# Patient Record
Sex: FEMALE | Race: WHITE | Employment: PART TIME | URBAN - METROPOLITAN AREA
[De-identification: names, ages, dates, MRNs, and addresses within clinical notes are randomized per-mention and may not be internally consistent; named-entity substitution may affect disease eponyms.]

---

## 2019-10-09 ENCOUNTER — THERAPY VISIT (OUTPATIENT)
Dept: PHYSICAL THERAPY | Facility: CLINIC | Age: 59
End: 2019-10-09
Payer: COMMERCIAL

## 2019-10-09 DIAGNOSIS — Z47.89 AFTERCARE FOLLOWING SURGERY OF THE MUSCULOSKELETAL SYSTEM: ICD-10-CM

## 2019-10-09 DIAGNOSIS — M76.821 TIBIALIS POSTERIOR TENDONITIS, RIGHT: ICD-10-CM

## 2019-10-09 DIAGNOSIS — M79.671 RIGHT FOOT PAIN: ICD-10-CM

## 2019-10-09 PROCEDURE — 97110 THERAPEUTIC EXERCISES: CPT | Mod: GP | Performed by: PHYSICAL THERAPIST

## 2019-10-09 PROCEDURE — 97140 MANUAL THERAPY 1/> REGIONS: CPT | Mod: GP | Performed by: PHYSICAL THERAPIST

## 2019-10-09 PROCEDURE — 97161 PT EVAL LOW COMPLEX 20 MIN: CPT | Mod: GP | Performed by: PHYSICAL THERAPIST

## 2019-10-09 NOTE — LETTER
[facility]  [address]  [facility] [address]  [phone]    2019    Re: Rhoda TAMMY Goel   :   1960  MRN:  [id number]   REFERRING PHYSICIAN:   Radu Martinez    [facility]    Date of Initial Evaluation:  10/19/19  Visits:  Rxs Used: 1  Reason for Referral:     Tibialis posterior tendonitis, right  Right foot pain  Aftercare following surgery of the musculoskeletal system    EVALUATION SUMMARY    [facility] Initial Evaluation  Subjective:  Pt presents to PT s/p right foot Puma procedure, bunion correction, posterior tibialis tenolysis, and bone spur removal of the tarso-metatarsal joints.  DOS was 19.  She is currently NWB and wearing a cam boot.    The history is provided by the patient.   Type of problem:  Right ankle and right foot   Condition occurred with:  Insidious onset.     Patient reports pain:  Medial.  Associated symptoms:  Loss of strength, edema and loss of motion/stiffness. Symptoms are exacerbated by weight bearing and walking and relieved by rest and ice.  Pertinent medical history includes:  Cancer, numbness/tingling, osteoarthritis, overweight and thyroid problems.    Surgeries include:  Cancer surgery and orthopedic surgery.  Current medications:  Anti-inflammatory and pain medication.   Primary job tasks include:  Computer work, prolonged standing and lifting/carrying.           Patient is Medical .   Objective:  Gait:    Weight Bearing Status:  NWB   Assistive Devices:  CAM  Ankle/Foot Evaluation  ROM:    AROM:    Dorsiflexion:  Left:   10  Right:   2  Plantarflexion:  Left:  65    Right:  40  Inversion:  Left:  50     Right:  2  Eversion:  18     Right:  10  PROM:    Great Toe Extension:  Left:    46     Right: 3  Strength:    Dorsiflexion:  Right: 4/5   Pain:  Plantarflexion: Right: 3-/5  Pain:  Inversion:Right: 2/5  Pain:  Eversion:Right: 4/5  Pain:  Re: Rhoda L  [place]   :   1960        Extension Great Toe:Right: 4/5  Pain:  EDEMA: Edema ankle: Moderate edema primarily in the right forefoot.  Assessment/Plan:    Patient is a 58 year old female with right foot and ankle complaints.    Patient has the following significant findings with corresponding treatment plan.                Diagnosis 1:  S/p right foot surgery  Pain -  hot/cold therapy, manual therapy and education  Decreased ROM/flexibility - manual therapy, therapeutic exercise and home program  Decreased strength - therapeutic exercise, therapeutic activities and home program  Edema - cold therapy and cryocuff    Therapy Evaluation Codes:   1) History comprised of:   Personal factors that impact the plan of care:      None.    Comorbidity factors that impact the plan of care are:      None.     Medications impacting care: None.  2) Examination of Body Systems comprised of:   Body structures and functions that impact the plan of care:      Ankle and foot.   Activity limitations that impact the plan of care are:      Driving, Stairs, Standing and Walking.  3) Clinical presentation characteristics are:   Stable/Uncomplicated.  4) Decision-Making    Low complexity using standardized patient assessment instrument and/or measureable assessment of functional outcome.  Cumulative Therapy Evaluation is: Low complexity.    Previous and current functional limitations:  (See Goal Flow Sheet for this information)    Short term and Long term goals: (See Goal Flow Sheet for this information)                                     Re: Rhoda MUNOZ [place]   :   1960                      Communication ability:  Patient appears to be able to clearly communicate and understand verbal and written communication and follow directions correctly.  Treatment Explanation - The following has been discussed with the patient:   RX ordered/plan of care  Anticipated outcomes  Possible risks and side effects  This patient would benefit from  PT intervention to resume normal activities.   Rehab potential is good.    Frequency:  1 X week, once daily  Duration:  for 10 weeks  Discharge Plan:  Achieve all LTG.  Independent in home treatment program.  Reach maximal therapeutic benefit.               Thank you for your referral.    INQUIRIES  Luis Lancaster, PT  [facility] [place]  [address] [facility] [address]  Phone: [phone]  Fax: [phone]

## 2019-10-09 NOTE — PROGRESS NOTES
[facility] Initial Evaluation  Subjective:  Pt presents to PT s/p right foot Puma procedure, bunion correction, posterior tibialis tenolysis, and bone spur removal of the tarso-metatarsal joints.  DOS was 8/16/19.  She is currently NWB and wearing a cam boot.      The history is provided by the patient.   Type of problem:  Right ankle and right foot   Condition occurred with:  Insidious onset.     Patient reports pain:  Medial.  Associated symptoms:  Loss of strength, edema and loss of motion/stiffness. Symptoms are exacerbated by weight bearing and walking and relieved by rest and ice.                      Objective:    Gait:    Weight Bearing Status:  NWB   Assistive Devices:  CAM            Ankle/Foot Evaluation  ROM:    AROM:    Dorsiflexion:  Left:   10  Right:   2  Plantarflexion:  Left:  65    Right:  40  Inversion:  Left:  50     Right:  2  Eversion:  18     Right:  10      PROM:              Great Toe Extension:  Left:    46     Right: 3      Strength:    Dorsiflexion:  Right: 4/5   Pain:  Plantarflexion: Right: 3-/5  Pain:  Inversion:Right: 2/5  Pain:  Eversion:Right: 4/5  Pain:    Extension Great Toe:Right: 4/5  Pain:                    EDEMA: Edema ankle: Moderate edema primarily in the right forefoot.                                                              General     ROS    Assessment/Plan:    Patient is a 58 year old female with right foot and ankle complaints.    Patient has the following significant findings with corresponding treatment plan.                Diagnosis 1:  S/p right foot surgery  Pain -  hot/cold therapy, manual therapy and education  Decreased ROM/flexibility - manual therapy, therapeutic exercise and home program  Decreased strength - therapeutic exercise, therapeutic activities and home program  Edema - cold therapy and cryocuff    Therapy Evaluation Codes:   1) History comprised of:   Personal factors that impact the plan of care:      None.     Comorbidity factors that impact the plan of care are:      None.     Medications impacting care: None.  2) Examination of Body Systems comprised of:   Body structures and functions that impact the plan of care:      Ankle and foot.   Activity limitations that impact the plan of care are:      Driving, Stairs, Standing and Walking.  3) Clinical presentation characteristics are:   Stable/Uncomplicated.  4) Decision-Making    Low complexity using standardized patient assessment instrument and/or measureable assessment of functional outcome.  Cumulative Therapy Evaluation is: Low complexity.    Previous and current functional limitations:  (See Goal Flow Sheet for this information)    Short term and Long term goals: (See Goal Flow Sheet for this information)     Communication ability:  Patient appears to be able to clearly communicate and understand verbal and written communication and follow directions correctly.  Treatment Explanation - The following has been discussed with the patient:   RX ordered/plan of care  Anticipated outcomes  Possible risks and side effects  This patient would benefit from PT intervention to resume normal activities.   Rehab potential is good.    Frequency:  1 X week, once daily  Duration:  for 10 weeks  Discharge Plan:  Achieve all LTG.  Independent in home treatment program.  Reach maximal therapeutic benefit.    Please refer to the daily flowsheet for treatment today, total treatment time and time spent performing 1:1 timed codes.

## 2019-10-09 NOTE — PROGRESS NOTES
[facility] Initial Evaluation  Subjective:       Pertinent medical history includes:  Cancer, numbness/tingling, osteoarthritis, overweight and thyroid problems.    Surgeries include:  Cancer surgery and orthopedic surgery.  Current medications:  Anti-inflammatory and pain medication.   Primary job tasks include:  Computer work, prolonged standing and lifting/carrying.           Patient is Medical .                           Objective:  System    Physical Exam    General     ROS    Assessment/Plan:

## 2019-10-15 ENCOUNTER — THERAPY VISIT (OUTPATIENT)
Dept: PHYSICAL THERAPY | Facility: CLINIC | Age: 59
End: 2019-10-15
Payer: COMMERCIAL

## 2019-10-15 DIAGNOSIS — Z47.89 AFTERCARE FOLLOWING SURGERY OF THE MUSCULOSKELETAL SYSTEM: ICD-10-CM

## 2019-10-15 DIAGNOSIS — M79.671 RIGHT FOOT PAIN: Primary | ICD-10-CM

## 2019-10-15 PROCEDURE — 97140 MANUAL THERAPY 1/> REGIONS: CPT | Mod: GP | Performed by: PHYSICAL THERAPIST

## 2019-10-15 PROCEDURE — 97110 THERAPEUTIC EXERCISES: CPT | Mod: GP | Performed by: PHYSICAL THERAPIST

## 2019-10-21 ENCOUNTER — THERAPY VISIT (OUTPATIENT)
Dept: PHYSICAL THERAPY | Facility: CLINIC | Age: 59
End: 2019-10-21
Payer: COMMERCIAL

## 2019-10-21 DIAGNOSIS — Z47.89 AFTERCARE FOLLOWING SURGERY OF THE MUSCULOSKELETAL SYSTEM: ICD-10-CM

## 2019-10-21 DIAGNOSIS — M79.671 RIGHT FOOT PAIN: ICD-10-CM

## 2019-10-21 PROCEDURE — 97140 MANUAL THERAPY 1/> REGIONS: CPT | Mod: GP

## 2019-10-21 PROCEDURE — 97110 THERAPEUTIC EXERCISES: CPT | Mod: GP

## 2019-10-29 ENCOUNTER — THERAPY VISIT (OUTPATIENT)
Dept: PHYSICAL THERAPY | Facility: CLINIC | Age: 59
End: 2019-10-29
Payer: COMMERCIAL

## 2019-10-29 DIAGNOSIS — Z47.89 AFTERCARE FOLLOWING SURGERY OF THE MUSCULOSKELETAL SYSTEM: ICD-10-CM

## 2019-10-29 DIAGNOSIS — M79.671 RIGHT FOOT PAIN: ICD-10-CM

## 2019-10-29 PROCEDURE — 97110 THERAPEUTIC EXERCISES: CPT | Mod: GP | Performed by: PHYSICAL THERAPIST

## 2019-10-29 PROCEDURE — 97140 MANUAL THERAPY 1/> REGIONS: CPT | Mod: GP | Performed by: PHYSICAL THERAPIST

## 2019-11-05 ENCOUNTER — THERAPY VISIT (OUTPATIENT)
Dept: PHYSICAL THERAPY | Facility: CLINIC | Age: 59
End: 2019-11-05
Payer: COMMERCIAL

## 2019-11-05 DIAGNOSIS — M79.671 RIGHT FOOT PAIN: ICD-10-CM

## 2019-11-05 DIAGNOSIS — Z47.89 AFTERCARE FOLLOWING SURGERY OF THE MUSCULOSKELETAL SYSTEM: ICD-10-CM

## 2019-11-05 PROCEDURE — 97110 THERAPEUTIC EXERCISES: CPT | Mod: GP | Performed by: PHYSICAL THERAPIST

## 2019-11-05 PROCEDURE — 97140 MANUAL THERAPY 1/> REGIONS: CPT | Mod: GP | Performed by: PHYSICAL THERAPIST

## 2019-11-05 NOTE — LETTER
[facility]  [id number] [place] [id number] [phone]    2019    Re: Rhoda Goel   :   1960  MRN:  [id number]   REFERRING PHYSICIAN:   Radu Martinez    [facility]  Date of Initial Evaluation:  2019  Visits:  Rxs Used: 5  Reason for Referral:     Right foot pain  Aftercare following surgery of the musculoskeletal system    PROGRESS  REPORT  Progress reporting period is from 10/9/19 to 19.       SUBJECTIVE  Subjective changes noted by patient:   Subjective: Walking without crutches now in the boot.  Has taken a few steps without the boot.  Reluctant to try driving yet.      Current pain level is NA  .     Previous pain level was  NA  .   Changes in function:  Yes (See Goal flowsheet attached for changes in current functional level)  Adverse reaction to treatment or activity: None    OBJECTIVE  Changes noted in objective findings:    Objective: AROM: DF=9, PF=41.  1st MTP remains very limited with ext and flex.  Incisions healing well.  Mild swelling.      ASSESSMENT/PLAN  Updated problem list and treatment plan: Diagnosis 1:  S/p right Puma procedure, bunion correction, and post tib tenolysis     STG/LTGs have been met or progress has been made towards goals:  Yes (See Goal flow sheet completed today.)  Assessment of Progress: The patient's condition is improving.  Self Management Plans:  Patient has been instructed in a home treatment program.  Rhoda continues to require the following intervention to meet STG and LTG's:  PT    Recommendations:  This patient would benefit from continued therapy.     Frequency:  1 X week, once daily  Duration:  for 4 weeks              Re: Rhoda Goel   :   1960                    Thank you for your referral.    INQUIRIES  Therapist: Luis Lancaster, PT  [facility]  [address] [facility] [address]  Phone:  [phone]  Fax: [phone]

## 2019-11-05 NOTE — PROGRESS NOTES
Subjective:  HPI                    Objective:  System    Physical Exam    General     ROS    Assessment/Plan:    PROGRESS  REPORT    Progress reporting period is from 10/9/19 to 11/5/19.       SUBJECTIVE  Subjective changes noted by patient:   Subjective: Walking without crutches now in the boot.  Has taken a few steps without the boot.  Reluctant to try driving yet.      Current pain level is NA  .     Previous pain level was  NA  .   Changes in function:  Yes (See Goal flowsheet attached for changes in current functional level)  Adverse reaction to treatment or activity: None    OBJECTIVE  Changes noted in objective findings:    Objective: AROM: DF=9, PF=41.  1st MTP remains very limited with ext and flex.  Incisions healing well.  Mild swelling.      ASSESSMENT/PLAN  Updated problem list and treatment plan: Diagnosis 1:  S/p right Puma procedure, bunion correction, and post tib tenolysis     STG/LTGs have been met or progress has been made towards goals:  Yes (See Goal flow sheet completed today.)  Assessment of Progress: The patient's condition is improving.  Self Management Plans:  Patient has been instructed in a home treatment program.    Rhoda continues to require the following intervention to meet STG and LTG's:  PT    Recommendations:  This patient would benefit from continued therapy.     Frequency:  1 X week, once daily  Duration:  for 4 weeks        Please refer to the daily flowsheet for treatment today, total treatment time and time spent performing 1:1 timed codes.

## 2019-11-12 ENCOUNTER — THERAPY VISIT (OUTPATIENT)
Dept: PHYSICAL THERAPY | Facility: CLINIC | Age: 59
End: 2019-11-12
Payer: COMMERCIAL

## 2019-11-12 DIAGNOSIS — M79.671 RIGHT FOOT PAIN: ICD-10-CM

## 2019-11-12 DIAGNOSIS — Z47.89 AFTERCARE FOLLOWING SURGERY OF THE MUSCULOSKELETAL SYSTEM: ICD-10-CM

## 2019-11-12 PROCEDURE — 97110 THERAPEUTIC EXERCISES: CPT | Mod: GP | Performed by: PHYSICAL THERAPIST

## 2019-11-12 PROCEDURE — 97140 MANUAL THERAPY 1/> REGIONS: CPT | Mod: GP | Performed by: PHYSICAL THERAPIST

## 2019-11-19 ENCOUNTER — THERAPY VISIT (OUTPATIENT)
Dept: PHYSICAL THERAPY | Facility: CLINIC | Age: 59
End: 2019-11-19
Payer: COMMERCIAL

## 2019-11-19 DIAGNOSIS — M79.671 RIGHT FOOT PAIN: ICD-10-CM

## 2019-11-19 DIAGNOSIS — Z47.89 AFTERCARE FOLLOWING SURGERY OF THE MUSCULOSKELETAL SYSTEM: ICD-10-CM

## 2019-11-19 PROCEDURE — 97110 THERAPEUTIC EXERCISES: CPT | Mod: GP | Performed by: PHYSICAL THERAPIST

## 2019-11-19 PROCEDURE — 97140 MANUAL THERAPY 1/> REGIONS: CPT | Mod: GP | Performed by: PHYSICAL THERAPIST

## 2019-11-29 ENCOUNTER — THERAPY VISIT (OUTPATIENT)
Dept: PHYSICAL THERAPY | Facility: CLINIC | Age: 59
End: 2019-11-29
Payer: COMMERCIAL

## 2019-11-29 DIAGNOSIS — M79.671 RIGHT FOOT PAIN: ICD-10-CM

## 2019-11-29 DIAGNOSIS — Z47.89 AFTERCARE FOLLOWING SURGERY OF THE MUSCULOSKELETAL SYSTEM: ICD-10-CM

## 2019-11-29 PROCEDURE — 97110 THERAPEUTIC EXERCISES: CPT | Mod: GP | Performed by: PHYSICAL THERAPIST

## 2019-11-29 PROCEDURE — 97140 MANUAL THERAPY 1/> REGIONS: CPT | Mod: GP | Performed by: PHYSICAL THERAPIST

## 2019-12-05 ENCOUNTER — THERAPY VISIT (OUTPATIENT)
Dept: PHYSICAL THERAPY | Facility: CLINIC | Age: 59
End: 2019-12-05
Payer: COMMERCIAL

## 2019-12-05 DIAGNOSIS — Z47.89 AFTERCARE FOLLOWING SURGERY OF THE MUSCULOSKELETAL SYSTEM: ICD-10-CM

## 2019-12-05 DIAGNOSIS — M79.671 RIGHT FOOT PAIN: ICD-10-CM

## 2019-12-05 PROCEDURE — 97140 MANUAL THERAPY 1/> REGIONS: CPT | Mod: GP

## 2019-12-05 PROCEDURE — 97110 THERAPEUTIC EXERCISES: CPT | Mod: GP

## 2019-12-11 ENCOUNTER — THERAPY VISIT (OUTPATIENT)
Dept: PHYSICAL THERAPY | Facility: CLINIC | Age: 59
End: 2019-12-11
Payer: COMMERCIAL

## 2019-12-11 DIAGNOSIS — M79.671 RIGHT FOOT PAIN: ICD-10-CM

## 2019-12-11 DIAGNOSIS — Z47.89 AFTERCARE FOLLOWING SURGERY OF THE MUSCULOSKELETAL SYSTEM: ICD-10-CM

## 2019-12-11 PROCEDURE — 97140 MANUAL THERAPY 1/> REGIONS: CPT | Mod: GP | Performed by: PHYSICAL THERAPIST

## 2019-12-11 PROCEDURE — 97110 THERAPEUTIC EXERCISES: CPT | Mod: GP | Performed by: PHYSICAL THERAPIST

## 2019-12-11 NOTE — LETTER
[facility]  [address]  [facility] [address]  [phone]    2019    Re: Rhoda Goel   :   1960  MRN:  [id number]   REFERRING PHYSICIAN:   Radu Martinez    [facility]    Date of Initial Evaluation:  10/09/19  Visits:  Rxs Used: 10  Reason for Referral:     Right foot pain  Aftercare following surgery of the musculoskeletal system    DISCHARGE REPORT  Progress reporting period is from 10/9/19 to 19 (10 visits).       SUBJECTIVE  Subjective changes noted by patient:    Subjective: Working normal work shifts and reports swelling at the end of her day.  Numbness in heel, pain in forefoot and plantar side of the 1st MTP joint.  Walking with shoes at all times with about a 10 minute tolerance.      Current pain level is 2/10  .     Previous pain level was  NA  .   Changes in function:  Yes (See Goal flowsheet attached for changes in current functional level)  Adverse reaction to treatment or activity: None    OBJECTIVE  Changes noted in objective findings:    Objective: Minimal swelling.  Great toe is moving better, but remains limited.  Ext of the 1st MTP is 30-35 degrees now and flexion has also improved.  Ankle DF/PF are nearly equal to the left foot.  MMT is good, but remains weak with toe raises.       ASSESSMENT/PLAN  Updated problem list and treatment plan: Diagnosis 1:  S/p right foot surgery   STG/LTGs have been met or progress has been made towards goals:  Yes (See Goal flow sheet completed today.)  Assessment of Progress: The patient's condition is improving.  Self Management Plans:  Patient has been instructed in a home treatment program.    Rhoda continues to require the following intervention to meet STG and LTG's:  PT intervention is no longer required to meet STG/LTG.    Recommendations:  This patient is ready to be discharged from therapy and continue their home treatment program.          Thank you  for your referral.    INQUIRIES  Therapist: Luis Lancaster PT  [facility] [place]  [address] [facility] [address]  Phone: [phone]  Fax: [phone]

## 2019-12-11 NOTE — PROGRESS NOTES
Subjective:  HPI                    Objective:  System    Physical Exam    General     ROS    Assessment/Plan:    DISCHARGE REPORT    Progress reporting period is from 10/9/19 to 12/11/19 (10 visits).       SUBJECTIVE  Subjective changes noted by patient:    Subjective: Working normal work shifts and reports swelling at the end of her day.  Numbness in heel, pain in forefoot and plantar side of the 1st MTP joint.  Walking with shoes at all times with about a 10 minute tolerance.      Current pain level is 2/10  .     Previous pain level was  NA  .   Changes in function:  Yes (See Goal flowsheet attached for changes in current functional level)  Adverse reaction to treatment or activity: None    OBJECTIVE  Changes noted in objective findings:    Objective: Minimal swelling.  Great toe is moving better, but remains limited.  Ext of the 1st MTP is 30-35 degrees now and flexion has also improved.  Ankle DF/PF are nearly equal to the left foot.  MMT is good, but remains weak with toe raises.       ASSESSMENT/PLAN  Updated problem list and treatment plan: Diagnosis 1:  S/p right foot surgery   STG/LTGs have been met or progress has been made towards goals:  Yes (See Goal flow sheet completed today.)  Assessment of Progress: The patient's condition is improving.  Self Management Plans:  Patient has been instructed in a home treatment program.    Rhoda continues to require the following intervention to meet STG and LTG's:  PT intervention is no longer required to meet STG/LTG.    Recommendations:  This patient is ready to be discharged from therapy and continue their home treatment program.    Please refer to the daily flowsheet for treatment today, total treatment time and time spent performing 1:1 timed codes.